# Patient Record
(demographics unavailable — no encounter records)

---

## 2024-10-07 NOTE — HISTORY OF PRESENT ILLNESS
[FreeTextEntry1] : follow-up, abdominal pain [de-identified] : 28 year old male here for a routine visit. Pt has no acute medical complaints today. Pt c/o abdominal pain occasionally which can occur sporadically.  It is not related to food intake.  He denies any nausea, vomiting, diarrhea or weight loss.

## 2024-10-07 NOTE — REVIEW OF SYSTEMS
[Fever] : no fever [Discharge] : no discharge [Vision Problems] : no vision problems [Earache] : no earache [Nasal Discharge] : no nasal discharge [Chest Pain] : no chest pain [Orthopena] : no orthopnea [Shortness Of Breath] : no shortness of breath [Abdominal Pain] : abdominal pain [Vomiting] : no vomiting [Dysuria] : no dysuria [Hematuria] : no hematuria [Joint Pain] : no joint pain [Back Pain] : no back pain [Itching] : no itching [Skin Rash] : no skin rash [Headache] : no headache [Memory Loss] : no memory loss [Suicidal] : not suicidal [Easy Bleeding] : no easy bleeding

## 2024-10-07 NOTE — HISTORY OF PRESENT ILLNESS
[FreeTextEntry1] : follow-up, abdominal pain [de-identified] : 28 year old male here for a routine visit. Pt has no acute medical complaints today. Pt c/o abdominal pain occasionally which can occur sporadically.  It is not related to food intake.  He denies any nausea, vomiting, diarrhea or weight loss.

## 2024-10-07 NOTE — PLAN
[FreeTextEntry1] : Abdominal pain - will check US abdomen Screening for metabolic disorder - will check labs Screening for STI - will check labs HMT- UTD